# Patient Record
Sex: MALE | Race: WHITE | Employment: OTHER | URBAN - METROPOLITAN AREA
[De-identification: names, ages, dates, MRNs, and addresses within clinical notes are randomized per-mention and may not be internally consistent; named-entity substitution may affect disease eponyms.]

---

## 2021-05-24 ENCOUNTER — APPOINTMENT (OUTPATIENT)
Dept: GENERAL RADIOLOGY | Age: 36
End: 2021-05-24
Attending: EMERGENCY MEDICINE

## 2021-05-24 ENCOUNTER — HOSPITAL ENCOUNTER (EMERGENCY)
Age: 36
Discharge: HOME | End: 2021-05-24
Attending: EMERGENCY MEDICINE

## 2021-05-24 VITALS
RESPIRATION RATE: 14 BRPM | OXYGEN SATURATION: 95 % | WEIGHT: 260 LBS | DIASTOLIC BLOOD PRESSURE: 65 MMHG | TEMPERATURE: 99.8 F | HEIGHT: 73 IN | SYSTOLIC BLOOD PRESSURE: 107 MMHG | BODY MASS INDEX: 34.46 KG/M2 | HEART RATE: 80 BPM

## 2021-05-24 DIAGNOSIS — J20.9 ACUTE BRONCHITIS, UNSPECIFIED ORGANISM: Primary | ICD-10-CM

## 2021-05-24 DIAGNOSIS — Z20.822 PERSON UNDER INVESTIGATION FOR COVID-19: ICD-10-CM

## 2021-05-24 LAB
BASOPHILS # BLD: 0 K/UL (ref 0–0.1)
BASOPHILS NFR BLD: 0 % (ref 0–2)
DIFFERENTIAL METHOD BLD: ABNORMAL
EOSINOPHIL # BLD: 0.1 K/UL (ref 0–0.4)
EOSINOPHIL NFR BLD: 0 % (ref 0–5)
ERYTHROCYTE [DISTWIDTH] IN BLOOD BY AUTOMATED COUNT: 12.9 % (ref 11.6–14.5)
HCT VFR BLD AUTO: 44.8 % (ref 36–48)
HGB BLD-MCNC: 14.6 G/DL (ref 13–16)
LYMPHOCYTES # BLD: 1.3 K/UL (ref 0.9–3.6)
LYMPHOCYTES NFR BLD: 8 % (ref 21–52)
MCH RBC QN AUTO: 29 PG (ref 24–34)
MCHC RBC AUTO-ENTMCNC: 32.6 G/DL (ref 31–37)
MCV RBC AUTO: 88.9 FL (ref 74–97)
MONOCYTES # BLD: 1 K/UL (ref 0.05–1.2)
MONOCYTES NFR BLD: 7 % (ref 3–10)
NEUTS SEG # BLD: 12.7 K/UL (ref 1.8–8)
NEUTS SEG NFR BLD: 84 % (ref 40–73)
PLATELET # BLD AUTO: 253 K/UL (ref 135–420)
PMV BLD AUTO: 11.3 FL (ref 9.2–11.8)
RBC # BLD AUTO: 5.04 M/UL (ref 4.35–5.65)
SARS-COV-2, COV2: NORMAL
WBC # BLD AUTO: 15.1 K/UL (ref 4.6–13.2)

## 2021-05-24 PROCEDURE — 93005 ELECTROCARDIOGRAM TRACING: CPT

## 2021-05-24 PROCEDURE — U0003 INFECTIOUS AGENT DETECTION BY NUCLEIC ACID (DNA OR RNA); SEVERE ACUTE RESPIRATORY SYNDROME CORONAVIRUS 2 (SARS-COV-2) (CORONAVIRUS DISEASE [COVID-19]), AMPLIFIED PROBE TECHNIQUE, MAKING USE OF HIGH THROUGHPUT TECHNOLOGIES AS DESCRIBED BY CMS-2020-01-R: HCPCS

## 2021-05-24 PROCEDURE — 99284 EMERGENCY DEPT VISIT MOD MDM: CPT

## 2021-05-24 PROCEDURE — 85025 COMPLETE CBC W/AUTO DIFF WBC: CPT

## 2021-05-24 PROCEDURE — 71045 X-RAY EXAM CHEST 1 VIEW: CPT

## 2021-05-24 RX ORDER — ALBUTEROL SULFATE 90 UG/1
2 AEROSOL, METERED RESPIRATORY (INHALATION)
Qty: 1 INHALER | Refills: 0 | Status: SHIPPED | OUTPATIENT
Start: 2021-05-24

## 2021-05-24 RX ORDER — PREDNISONE 10 MG/1
TABLET ORAL
Qty: 21 TABLET | Refills: 0 | Status: SHIPPED | OUTPATIENT
Start: 2021-05-24

## 2021-05-24 RX ORDER — AZITHROMYCIN 250 MG/1
TABLET, FILM COATED ORAL
Qty: 6 TABLET | Refills: 0 | Status: SHIPPED | OUTPATIENT
Start: 2021-05-24 | End: 2021-05-29

## 2021-05-24 NOTE — ED PROVIDER NOTES
EMERGENCY DEPARTMENT HISTORY AND PHYSICAL EXAM    Date: 5/24/2021  Patient Name: Marie Rivero    History of Presenting Illness     Chief Complaint   Patient presents with    Shortness of Breath    Cough         History Provided By: Patient    11:30 AM  Marie Rivero is a 30 y.o. male with PMHX of anxiety, PTSD who presents to the emergency department C/O cough, wheezing, mild shortness of breath, nasal congestion and sore throat, which began 2 to 3 days ago. No known sick contacts or exposure to COVID-19. Patient smokes cigarettes. Wife had similar but much more mild symptoms. Pt denies chest pain, recent travel, vomiting, and any other sxs or complaints. PCP: None    Current Outpatient Medications   Medication Sig Dispense Refill    azithromycin (Zithromax Z-Stef) 250 mg tablet Use per pack instructions. 6 Tablet 0    predniSONE (STERAPRED DS) 10 mg dose pack Use per pack instructions. 21 Tablet 0    albuterol (PROVENTIL HFA, VENTOLIN HFA, PROAIR HFA) 90 mcg/actuation inhaler Take 2 Puffs by inhalation every four (4) hours as needed for Wheezing. 1 Inhaler 0    ALPRAZolam (XANAX) 2 mg tablet Take 1 Tab by mouth nightly as needed for Anxiety. 5 Tab 0    ALPRAZolam (XANAX) 2 mg tablet Take  by mouth two (2) times a day.  ALPRAZolam (XANAX) 2 mg tablet Take 1 Tab by mouth two (2) times daily as needed for Anxiety. 12 Tab 0    oxyCODONE-acetaminophen (PERCOCET) 5-325 mg per tablet Take 1-2 Tabs by mouth every four (4) hours as needed for Pain. 30 Tab 0    oxyCODONE-acetaminophen (PERCOCET) 5-325 mg per tablet Take 2 Tabs by mouth every four (4) hours as needed. Indications: PAIN      multivitamin (ONE A DAY) tablet Take 1 Tab by mouth daily.            Past History     Past Medical History:  Past Medical History:   Diagnosis Date    Anxiety     Pneumonia     PTSD (post-traumatic stress disorder)        Past Surgical History:  Past Surgical History:   Procedure Laterality Date    HX CYST REMOVAL         Family History:  History reviewed. No pertinent family history. Social History:  Social History     Tobacco Use    Smoking status: Current Every Day Smoker     Packs/day: 0.50     Years: 2.00     Pack years: 1.00    Smokeless tobacco: Never Used   Substance Use Topics    Alcohol use: Yes     Alcohol/week: 1.7 standard drinks     Types: 2 Cans of beer per week     Comment: couple of times a week    Drug use: No       Allergies:  No Known Allergies      Review of Systems   Review of Systems   Constitutional: Negative for fever. HENT: Positive for congestion and sore throat. Respiratory: Positive for cough, shortness of breath and wheezing. Cardiovascular: Negative for chest pain. All other systems reviewed and are negative. Physical Exam     Vitals:    05/24/21 1101 05/24/21 1200 05/24/21 1303 05/24/21 1305   BP: 103/67 (!) 110/55 107/65    Pulse: 92 82 80    Resp: 14 15 14    Temp: 99.8 °F (37.7 °C)      SpO2: 95% 92% 95% 95%   Weight: 117.9 kg (260 lb)      Height: 6' 1\" (1.854 m)        Physical Exam    Vital signs and nursing notes reviewed. CONSTITUTIONAL: Alert. Well-appearing; well-nourished; in no apparent distress. HEAD: Normocephalic; atraumatic. EYES:  Conjunctiva clear. ENT: TM's normal. External ear normal. Normal nose; no congestion, no rhinorrhea. Normal pharynx. Tonsils not enlarged without exudate. Moist mucus membranes. NECK: Supple; FROM without difficulty, non-tender; no cervical lymphadenopathy. CV: Normal S1, S2; no murmurs, rubs, or gallops. No chest wall tenderness. RESPIRATORY: Normal chest excursion with respiration; mild scattered expiratory wheezes bilaterally. SKIN: Normal for age and race; warm; dry; good turgor; no apparent lesions or exudate. NEURO: A & O x3. PSYCH:  Mood and affect appropriate.          Diagnostic Study Results     Labs -     Recent Results (from the past 12 hour(s))   EKG, 12 LEAD, INITIAL    Collection Time: 05/24/21 11:15 AM   Result Value Ref Range    Ventricular Rate 85 BPM    Atrial Rate 85 BPM    P-R Interval 128 ms    QRS Duration 96 ms    Q-T Interval 368 ms    QTC Calculation (Bezet) 437 ms    Calculated P Axis 18 degrees    Calculated R Axis 37 degrees    Calculated T Axis 38 degrees    Diagnosis       Normal sinus rhythm  Incomplete right bundle branch block  Borderline ECG  When compared with ECG of 19-NOV-2012 16:49,  No significant change was found     CBC WITH AUTOMATED DIFF    Collection Time: 05/24/21 11:25 AM   Result Value Ref Range    WBC 15.1 (H) 4.6 - 13.2 K/uL    RBC 5.04 4.35 - 5.65 M/uL    HGB 14.6 13.0 - 16.0 g/dL    HCT 44.8 36.0 - 48.0 %    MCV 88.9 74.0 - 97.0 FL    MCH 29.0 24.0 - 34.0 PG    MCHC 32.6 31.0 - 37.0 g/dL    RDW 12.9 11.6 - 14.5 %    PLATELET 911 541 - 704 K/uL    MPV 11.3 9.2 - 11.8 FL    NEUTROPHILS 84 (H) 40 - 73 %    LYMPHOCYTES 8 (L) 21 - 52 %    MONOCYTES 7 3 - 10 %    EOSINOPHILS 0 0 - 5 %    BASOPHILS 0 0 - 2 %    ABS. NEUTROPHILS 12.7 (H) 1.8 - 8.0 K/UL    ABS. LYMPHOCYTES 1.3 0.9 - 3.6 K/UL    ABS. MONOCYTES 1.0 0.05 - 1.2 K/UL    ABS. EOSINOPHILS 0.1 0.0 - 0.4 K/UL    ABS. BASOPHILS 0.0 0.0 - 0.1 K/UL    DF AUTOMATED     SARS-COV-2    Collection Time: 05/24/21  1:00 PM   Result Value Ref Range    SARS-CoV-2 Please find results under separate order         Radiologic Studies -   XR CHEST PORT   Final Result      No acute radiographic cardiopulmonary abnormality. CT Results  (Last 48 hours)    None        CXR Results  (Last 48 hours)               05/24/21 1154  XR CHEST PORT Final result    Impression:      No acute radiographic cardiopulmonary abnormality. Narrative:  EXAM: XR CHEST PORT       CLINICAL INDICATION/HISTORY: shortness of breath   -Additional: None       COMPARISON: None       TECHNIQUE: Frontal view of the chest       _______________       FINDINGS:       HEART AND MEDIASTINUM: Normal cardiac size and mediastinal contours.        LUNGS AND PLEURAL SPACES: No focal pneumonic consolidation, pneumothorax, or   pleural effusion. BONY THORAX AND SOFT TISSUES: No acute osseous abnormality       _______________                 Medications given in the ED-  Medications - No data to display      Medical Decision Making   I am the first provider for this patient. I reviewed the vital signs, available nursing notes, past medical history, past surgical history, family history and social history. Vital Signs-Reviewed the patient's vital signs. Pulse Oximetry Analysis - 95% on RA       EKG interpretation: (Preliminary)   NSR, rate 85, No STEMI    Records Reviewed: Nursing Notes      Procedures:  Procedures    ED Course:  11:30 AM   Initial assessment performed. The patients presenting problems have been discussed, and they are in agreement with the care plan formulated and outlined with them. I have encouraged them to ask questions as they arise throughout their visit. Provider Notes (Medical Decision Making): James Steiner is a 30 y.o. male presented with URI symptoms, wheezing on exam.  Vitals stable, O2 saturation greater than 95% on room air and he is in no distress. Chest x-ray shows no acute bony or abnormality. EKG without ischemic changes. White blood cell count slightly elevated, exam consistent with bronchitis, COVID-19 test sent and pending at this time. Smoking cessation discussed as well as prescriptions for Zithromax, albuterol inhaler, prednisone taper, strict isolation per current CDC guidelines and return precautions. Diagnosis and Disposition       DISCHARGE NOTE:    Micaela Batista's  results have been reviewed with him. He has been counseled regarding his diagnosis, treatment, and plan. He verbally conveys understanding and agreement of the signs, symptoms, diagnosis, treatment and prognosis and additionally agrees to follow up as discussed.   He also agrees with the care-plan and conveys that all of his questions have been answered. I have also provided discharge instructions for him that include: educational information regarding their diagnosis and treatment, and list of reasons why they would want to return to the ED prior to their follow-up appointment, should his condition change. He has been provided with education for proper emergency department utilization. CLINICAL IMPRESSION:    1. Acute bronchitis, unspecified organism    2. Person under investigation for COVID-19        PLAN:  1. D/C Home  2. Discharge Medication List as of 5/24/2021 12:50 PM      START taking these medications    Details   azithromycin (Zithromax Z-Stef) 250 mg tablet Use per pack instructions. , Normal, Disp-6 Tablet, R-0      predniSONE (STERAPRED DS) 10 mg dose pack Use per pack instructions. , Normal, Disp-21 Tablet, R-0      albuterol (PROVENTIL HFA, VENTOLIN HFA, PROAIR HFA) 90 mcg/actuation inhaler Take 2 Puffs by inhalation every four (4) hours as needed for Wheezing., Normal, Disp-1 Inhaler, R-0         CONTINUE these medications which have NOT CHANGED    Details   !! ALPRAZolam (XANAX) 2 mg tablet Take 1 Tab by mouth nightly as needed for Anxiety. , Print, Disp-5 Tab, R-0      !! ALPRAZolam (XANAX) 2 mg tablet Take  by mouth two (2) times a day. Historical Med      !! ALPRAZolam (XANAX) 2 mg tablet Take 1 Tab by mouth two (2) times daily as needed for Anxiety. Print, 2 mg, Disp-12 Tab, R-0      !! oxyCODONE-acetaminophen (PERCOCET) 5-325 mg per tablet Take 1-2 Tabs by mouth every four (4) hours as needed for Pain. Print, 1-2 Tab, Disp-30 Tab, R-0      !! oxyCODONE-acetaminophen (PERCOCET) 5-325 mg per tablet Take 2 Tabs by mouth every four (4) hours as needed. Indications: PAINHistorical Med, 2 Tab      multivitamin (ONE A DAY) tablet Take 1 Tab by mouth daily. Historical Med, 1 Tab       !! - Potential duplicate medications found. Please discuss with provider.         3.   Follow-up Information     Follow up With Specialties Details Why Contact Info    Your PCP or Urgent Care  Schedule an appointment as soon as possible for a visit       [facility] EMERGENCY DE[facility] Emergency Medicine  As needed, If symptoms worsen [address] [address]  [phone]        _______________________________      Please note that this dictation was completed with Dogster, the computer voice recognition software. Quite often unanticipated grammatical, syntax, homophones, and other interpretive errors are inadvertently transcribed by the computer software. Please disregard these errors. Please excuse any errors that have escaped final proofreading.

## 2021-05-25 ENCOUNTER — PATIENT OUTREACH (OUTPATIENT)
Dept: CASE MANAGEMENT | Age: 36
End: 2021-05-25

## 2021-05-25 LAB
ATRIAL RATE: 85 BPM
CALCULATED P AXIS, ECG09: 18 DEGREES
CALCULATED R AXIS, ECG10: 37 DEGREES
CALCULATED T AXIS, ECG11: 38 DEGREES
DIAGNOSIS, 93000: NORMAL
P-R INTERVAL, ECG05: 128 MS
Q-T INTERVAL, ECG07: 368 MS
QRS DURATION, ECG06: 96 MS
QTC CALCULATION (BEZET), ECG08: 437 MS
SARS-COV-2, COV2NT: NOT DETECTED
VENTRICULAR RATE, ECG03: 85 BPM

## 2021-05-25 NOTE — PROGRESS NOTES
Patient contacted regarding [id number]. Discussed COVID-19 related testing which was available at this time. Test results were negative. Patient informed of results, if available? n/a. Outreach made within 2 business days of discharge: Reza  Coordinator contacted the patient by telephone to perform post discharge assessment. Verified name and  with patient as identifiers. Provided introduction to self, and explanation of LPN Care Coordinator role, and reason for call due to risk factors for infection and/or exposure to COVID-19. Symptoms reviewed with patient who verbalized the following symptoms: no new symptoms, no worsening symptoms and Patient states he feels perfectly fine. No symptoms at all during this time. Denies SOB, chest pain, cough, chills, fever, body aches, wheezing, lightheadness/dizziness,HA, n/v/d at this time. Due to no new or worsening symptoms encounter was not routed to provider for escalation. Discussed follow-up appointments. If no appointment was previously scheduled, appointment scheduling offered: yes  Counts include 234 beds at the [facility] Yahaira Martell follow up appointment(s): No future appointments. Non-[facility] follow up appointment(s): n/a    Patient encouraged to make an appointment with PCP for f/up. Advised to return to ED if symptoms worsen or fail to improve. Patients acknowledges understanding. Advance Care Planning:   Does patient have an Advance Directive: currently not on file; education provided     Patient has following risk factors of: Smoker    LPN reviewed discharge instructions, medical action plan and red flags such as increased shortness of breath, increasing fever and signs of decompensation with patient who verbalized understanding. Discussed exposure protocols and quarantine with CDC Guidelines What to do if you are sick with coronavirus disease .  Patient was given an opportunity for questions and concerns.  The patient agrees to contact the [facility] exposure line [phone], [facility] [address]EN[address]  ([phone]} and PCP office for questions related to their healthcare. LPN provided contact information for future needs. Reviewed and educated patient on any new and changed medications related to discharge diagnosis. Patient states he has received 3 calls from [facility] within  the last two hours. He is feeling fine. No symptoms at al during this time and does not wish to receive additional calls from [address]

## 2023-09-19 PROBLEM — G56.01 CARPAL TUNNEL SYNDROME OF RIGHT WRIST: Chronic | Status: ACTIVE | Noted: 2023-09-19

## 2023-09-19 NOTE — H&P
History and Physical        Patient: Arleth Laguerre               Sex: male          DOA: (Not on file)         YOB: 1985      Age:  41 y.o.        LOS:  LOS: 0 days        HPI:     Lex Dean is in for follow up of his off right-greater-than-left severe EMG-documented carpal tunnel syndrome. The patient is here today for evaluation and treatment. He is also complaining of some posterior cervical and shoulder pain, which seems to be related to his hands. He got the EMGs done, but he has not been back in to be evaluated. His hands do wake him up nightly. They are completely numb in the median nerve distribution, and he mentions the right hand is worse than the left. It takes him a good three to four hours in the morning to get the right hand working well enough for him to continue doing his job. Past Medical History:   Diagnosis Date    ADHD     On Call Psychiatry    Anxiety     On Call Psychiatry    Carpal tunnel syndrome     right, Helon Revel    Knee pain, left 2014    injured at work, previously followed by rheumatology per 1/30/15 ED note    Panic attacks     On Call Psychiatry    Pneumonia     PTSD (post-traumatic stress disorder)     On Call Psychiatry       Past Surgical History:   Procedure Laterality Date    ABSCESS DRAINAGE  01/24/2010    between intragluteal folds    PILONIDAL CYST EXCISION  11/21/2012    WISDOM TOOTH EXTRACTION         No family history on file. Social History     Socioeconomic History    Marital status: Single   Tobacco Use    Smoking status: Every Day     Packs/day: .5     Types: Cigarettes    Smokeless tobacco: Never   Vaping Use    Vaping Use: Never used   Substance and Sexual Activity    Alcohol use: Not Currently     Alcohol/week: 1.7 standard drinks of alcohol    Drug use: Yes     Types: Marijuana Kym Larve)       Prior to Admission medications    Medication Sig Start Date End Date Taking?  Authorizing Provider   amphetamine-dextroamphetamine (ADDERALL) 20 MG

## 2023-09-22 ENCOUNTER — HOSPITAL ENCOUNTER (OUTPATIENT)
Facility: HOSPITAL | Age: 38
Discharge: HOME | End: 2023-09-25

## 2023-09-22 VITALS — HEIGHT: 71 IN | WEIGHT: 233 LBS | BODY MASS INDEX: 32.62 KG/M2

## 2023-09-22 LAB — SENTARA SPECIMEN COLLECTION: NORMAL

## 2023-09-22 NOTE — PERIOP NOTE
Neck 16.5\", denies diabetes & sleep apnea.  Not taking blood thinners, digoxin, nor lithium; no implants

## 2023-09-26 NOTE — PERIOP NOTE
HgA1C order faxed to [facility] Reference lab with request to add on to the blood work collected on 9-22-23. Fax confirmation received.

## 2023-09-28 ENCOUNTER — ANESTHESIA (OUTPATIENT)
Facility: HOSPITAL | Age: 38
End: 2023-09-28
Payer: MEDICAID

## 2023-09-28 ENCOUNTER — HOSPITAL ENCOUNTER (OUTPATIENT)
Facility: HOSPITAL | Age: 38
Setting detail: OUTPATIENT SURGERY
Discharge: HOME | End: 2023-09-28
Attending: ORTHOPAEDIC SURGERY | Admitting: ORTHOPAEDIC SURGERY
Payer: MEDICAID

## 2023-09-28 ENCOUNTER — ANESTHESIA EVENT (OUTPATIENT)
Facility: HOSPITAL | Age: 38
End: 2023-09-28
Payer: MEDICAID

## 2023-09-28 VITALS
TEMPERATURE: 98.2 F | SYSTOLIC BLOOD PRESSURE: 128 MMHG | BODY MASS INDEX: 31.61 KG/M2 | WEIGHT: 233.4 LBS | DIASTOLIC BLOOD PRESSURE: 69 MMHG | HEART RATE: 79 BPM | OXYGEN SATURATION: 100 % | RESPIRATION RATE: 15 BRPM | HEIGHT: 72 IN

## 2023-09-28 DIAGNOSIS — Z01.818 PRE-OP TESTING: ICD-10-CM

## 2023-09-28 DIAGNOSIS — G56.01 CARPAL TUNNEL SYNDROME OF RIGHT WRIST: Primary | Chronic | ICD-10-CM

## 2023-09-28 PROCEDURE — 6360000002 HC RX W HCPCS: Performed by: ORTHOPAEDIC SURGERY

## 2023-09-28 PROCEDURE — 2500000003 HC RX 250 WO HCPCS: Performed by: NURSE ANESTHETIST, CERTIFIED REGISTERED

## 2023-09-28 PROCEDURE — 7100000010 HC PHASE II RECOVERY - FIRST 15 MIN: Performed by: ORTHOPAEDIC SURGERY

## 2023-09-28 PROCEDURE — 7100000011 HC PHASE II RECOVERY - ADDTL 15 MIN: Performed by: ORTHOPAEDIC SURGERY

## 2023-09-28 PROCEDURE — 6360000002 HC RX W HCPCS: Performed by: NURSE ANESTHETIST, CERTIFIED REGISTERED

## 2023-09-28 PROCEDURE — 3700000001 HC ADD 15 MINUTES (ANESTHESIA): Performed by: ORTHOPAEDIC SURGERY

## 2023-09-28 PROCEDURE — 3600000002 HC SURGERY LEVEL 2 BASE: Performed by: ORTHOPAEDIC SURGERY

## 2023-09-28 PROCEDURE — C1713 ANCHOR/SCREW BN/BN,TIS/BN: HCPCS | Performed by: ORTHOPAEDIC SURGERY

## 2023-09-28 PROCEDURE — 7100000000 HC PACU RECOVERY - FIRST 15 MIN: Performed by: ORTHOPAEDIC SURGERY

## 2023-09-28 PROCEDURE — 3600000012 HC SURGERY LEVEL 2 ADDTL 15MIN: Performed by: ORTHOPAEDIC SURGERY

## 2023-09-28 PROCEDURE — 3700000000 HC ANESTHESIA ATTENDED CARE: Performed by: ORTHOPAEDIC SURGERY

## 2023-09-28 PROCEDURE — 2580000003 HC RX 258: Performed by: ORTHOPAEDIC SURGERY

## 2023-09-28 PROCEDURE — 2709999900 HC NON-CHARGEABLE SUPPLY: Performed by: ORTHOPAEDIC SURGERY

## 2023-09-28 PROCEDURE — 7100000001 HC PACU RECOVERY - ADDTL 15 MIN: Performed by: ORTHOPAEDIC SURGERY

## 2023-09-28 RX ORDER — FENTANYL CITRATE 50 UG/ML
50 INJECTION, SOLUTION INTRAMUSCULAR; INTRAVENOUS EVERY 5 MIN PRN
Status: DISCONTINUED | OUTPATIENT
Start: 2023-09-28 | End: 2023-09-28 | Stop reason: HOSPADM

## 2023-09-28 RX ORDER — PROPOFOL 10 MG/ML
INJECTION, EMULSION INTRAVENOUS PRN
Status: DISCONTINUED | OUTPATIENT
Start: 2023-09-28 | End: 2023-09-28 | Stop reason: SDUPTHER

## 2023-09-28 RX ORDER — SODIUM CHLORIDE 0.9 % (FLUSH) 0.9 %
5-40 SYRINGE (ML) INJECTION EVERY 12 HOURS SCHEDULED
Status: DISCONTINUED | OUTPATIENT
Start: 2023-09-28 | End: 2023-09-28 | Stop reason: HOSPADM

## 2023-09-28 RX ORDER — SODIUM CHLORIDE, SODIUM LACTATE, POTASSIUM CHLORIDE, CALCIUM CHLORIDE 600; 310; 30; 20 MG/100ML; MG/100ML; MG/100ML; MG/100ML
INJECTION, SOLUTION INTRAVENOUS CONTINUOUS
Status: DISCONTINUED | OUTPATIENT
Start: 2023-09-28 | End: 2023-09-28 | Stop reason: HOSPADM

## 2023-09-28 RX ORDER — ONDANSETRON 2 MG/ML
4 INJECTION INTRAMUSCULAR; INTRAVENOUS
Status: DISCONTINUED | OUTPATIENT
Start: 2023-09-28 | End: 2023-09-28 | Stop reason: HOSPADM

## 2023-09-28 RX ORDER — ONDANSETRON 2 MG/ML
INJECTION INTRAMUSCULAR; INTRAVENOUS PRN
Status: DISCONTINUED | OUTPATIENT
Start: 2023-09-28 | End: 2023-09-28 | Stop reason: SDUPTHER

## 2023-09-28 RX ORDER — OXYCODONE HYDROCHLORIDE 5 MG/1
10 TABLET ORAL PRN
Status: DISCONTINUED | OUTPATIENT
Start: 2023-09-28 | End: 2023-09-28 | Stop reason: HOSPADM

## 2023-09-28 RX ORDER — SODIUM CHLORIDE 9 MG/ML
INJECTION, SOLUTION INTRAVENOUS PRN
Status: DISCONTINUED | OUTPATIENT
Start: 2023-09-28 | End: 2023-09-28 | Stop reason: HOSPADM

## 2023-09-28 RX ORDER — MIDAZOLAM HYDROCHLORIDE 1 MG/ML
INJECTION INTRAMUSCULAR; INTRAVENOUS PRN
Status: DISCONTINUED | OUTPATIENT
Start: 2023-09-28 | End: 2023-09-28 | Stop reason: SDUPTHER

## 2023-09-28 RX ORDER — DEXAMETHASONE SODIUM PHOSPHATE 4 MG/ML
INJECTION, SOLUTION INTRA-ARTICULAR; INTRALESIONAL; INTRAMUSCULAR; INTRAVENOUS; SOFT TISSUE PRN
Status: DISCONTINUED | OUTPATIENT
Start: 2023-09-28 | End: 2023-09-28 | Stop reason: SDUPTHER

## 2023-09-28 RX ORDER — SODIUM CHLORIDE 0.9 % (FLUSH) 0.9 %
5-40 SYRINGE (ML) INJECTION PRN
Status: DISCONTINUED | OUTPATIENT
Start: 2023-09-28 | End: 2023-09-28 | Stop reason: HOSPADM

## 2023-09-28 RX ORDER — FENTANYL CITRATE 50 UG/ML
INJECTION, SOLUTION INTRAMUSCULAR; INTRAVENOUS PRN
Status: DISCONTINUED | OUTPATIENT
Start: 2023-09-28 | End: 2023-09-28 | Stop reason: SDUPTHER

## 2023-09-28 RX ORDER — KETOROLAC TROMETHAMINE 15 MG/ML
INJECTION, SOLUTION INTRAMUSCULAR; INTRAVENOUS PRN
Status: DISCONTINUED | OUTPATIENT
Start: 2023-09-28 | End: 2023-09-28 | Stop reason: SDUPTHER

## 2023-09-28 RX ORDER — OXYCODONE HYDROCHLORIDE 5 MG/1
5 TABLET ORAL PRN
Status: DISCONTINUED | OUTPATIENT
Start: 2023-09-28 | End: 2023-09-28 | Stop reason: HOSPADM

## 2023-09-28 RX ORDER — BUPIVACAINE HYDROCHLORIDE 2.5 MG/ML
INJECTION, SOLUTION EPIDURAL; INFILTRATION; INTRACAUDAL PRN
Status: DISCONTINUED | OUTPATIENT
Start: 2023-09-28 | End: 2023-09-28 | Stop reason: ALTCHOICE

## 2023-09-28 RX ORDER — LIDOCAINE HYDROCHLORIDE 20 MG/ML
INJECTION, SOLUTION EPIDURAL; INFILTRATION; INTRACAUDAL; PERINEURAL PRN
Status: DISCONTINUED | OUTPATIENT
Start: 2023-09-28 | End: 2023-09-28 | Stop reason: SDUPTHER

## 2023-09-28 RX ORDER — IBUPROFEN 400 MG/1
TABLET ORAL
COMMUNITY
Start: 2023-07-25

## 2023-09-28 RX ADMIN — MIDAZOLAM 2 MG: 1 INJECTION INTRAMUSCULAR; INTRAVENOUS at 12:28

## 2023-09-28 RX ADMIN — PROPOFOL 200 MG: 10 INJECTION, EMULSION INTRAVENOUS at 12:31

## 2023-09-28 RX ADMIN — KETOROLAC TROMETHAMINE 30 MG: 15 INJECTION, SOLUTION INTRAMUSCULAR; INTRAVENOUS at 12:55

## 2023-09-28 RX ADMIN — SODIUM CHLORIDE, POTASSIUM CHLORIDE, SODIUM LACTATE AND CALCIUM CHLORIDE: 600; 310; 30; 20 INJECTION, SOLUTION INTRAVENOUS at 10:55

## 2023-09-28 RX ADMIN — ONDANSETRON HYDROCHLORIDE 4 MG: 2 INJECTION INTRAMUSCULAR; INTRAVENOUS at 12:31

## 2023-09-28 RX ADMIN — DEXAMETHASONE SODIUM PHOSPHATE 4 MG: 4 INJECTION, SOLUTION INTRAMUSCULAR; INTRAVENOUS at 12:31

## 2023-09-28 RX ADMIN — FENTANYL CITRATE 100 MCG: 50 INJECTION, SOLUTION INTRAMUSCULAR; INTRAVENOUS at 12:28

## 2023-09-28 RX ADMIN — LIDOCAINE HYDROCHLORIDE 100 MG: 20 INJECTION, SOLUTION EPIDURAL; INFILTRATION; INTRACAUDAL; PERINEURAL at 12:31

## 2023-09-28 RX ADMIN — FENTANYL CITRATE 100 MCG: 50 INJECTION, SOLUTION INTRAMUSCULAR; INTRAVENOUS at 12:53

## 2023-09-28 ASSESSMENT — PAIN - FUNCTIONAL ASSESSMENT: PAIN_FUNCTIONAL_ASSESSMENT: 0-10

## 2023-09-28 ASSESSMENT — LIFESTYLE VARIABLES: SMOKING_STATUS: 1

## 2023-09-28 ASSESSMENT — PAIN DESCRIPTION - DESCRIPTORS: DESCRIPTORS: NUMBNESS;TINGLING;SHOOTING

## 2023-09-28 NOTE — PERIOP NOTE
TRANSFER - IN REPORT:    Verbal report received from OR nurse and CRNA on Elisabeth Marte  being received from OR for routine post-op      Report consisted of patient's Situation, Background, Assessment and   Recommendations(SBAR). Information from the following report(s) Nurse Handoff Report, Adult Overview, Surgery Report, Intake/Output, MAR, Recent Results, and Med Rec Status was reviewed with the receiving nurse. Opportunity for questions and clarification was provided. Assessment completed upon patient's arrival to unit and care assumed.

## 2023-09-28 NOTE — PERIOP NOTE
TRANSFER - OUT REPORT:    Verbal report given to Milli Kelly RN on Cornelius Lora  being transferred to phase 2 for routine post-op       Report consisted of patient's Situation, Background, Assessment and   Recommendations(SBAR). Information from the following report(s) Surgery Report was reviewed with the receiving nurse. Lines:   Peripheral IV 09/28/23 Left;Ventral Forearm (Active)   Site Assessment Clean, dry & intact 09/28/23 1305   Line Status Infusing 09/28/23 1305   Phlebitis Assessment No symptoms 09/28/23 1305   Infiltration Assessment 0 09/28/23 1305   Dressing Status Clean, dry & intact 09/28/23 1305   Dressing Type Transparent 09/28/23 1305        Opportunity for questions and clarification was provided.       Patient transported with:  [employer]

## 2023-09-28 NOTE — PERIOP NOTE
TRANSFER - IN REPORT:    Verbal report received from [address] on Erik Rodriguez  being received from PACU for routine progression of patient care      Report consisted of patient's Situation, Background, Assessment and   Recommendations(SBAR). Information from the following report(s) Nurse Handoff Report, Surgery Report, Intake/Output, MAR, and Recent Results was reviewed with the receiving nurse. Opportunity for questions and clarification was provided. Assessment completed upon patient's arrival to unit and care assumed.

## 2023-09-28 NOTE — OP NOTE
Single Incision Endoscopic Carpal Tunnel Release    Patient: Rocio Thibodeaux MRN: [id number]  CSN: [id number]   YOB: 1985  Age: 41 y.o. Sex: male      Date of Surgery:9/28/2023     PREOPERATIVE DIAGNOSES:  right Carpal Tunnel Syndrome      POSTOPERATIVE DIAGNOSES:   right Carpal Tunnel Syndrome      PROCEDURE: right Single Incision Endoscpoic Carpal Tunnel Release    IMPLANTS: * No implants in log *    SURGEON: IRENA Flores III, MD    FIRST ASSISTANT: Scrub Person First: Mike Hill RN  Scrub Person Second: Abbi Glover RN  Physician Assistant: El Burroughs PA-C    SPECIMEN TO PATHOLOGY:  * No specimens in log *    ESTIMATED BLOOD LOSS: none    FINDINGS:  Same     TOURNIQUET  TIME:   Approximately 5 minutes at 043 Hg     COMPLICATIONS:  None     ANESTHESIA:  General    INDICATIONS:  A 41 y.o. White (non-) male  with known carpal tunnel syndrome documented by clinical exam and nerve conduction studies. The patient now presents for a ECTR. PROCEDURE: The patient was brought into the operating theater and after adequate prepping and draping of the surgical wrist and hand the limb was then exsanguinated with an Esmarch bandage and this was clamped around the proximal forearm. A 1 cm transverse incision was placed at the volar flexion wrist crease just ulnar to the palmaris longus. The incision was deepened to the antebrachial fascia which was released the length of the wound and proximally for an additional 1 cm. Using the [employer] carpal tunnel release kit, the carpal tunnel was dilated in line with the fourth metacarpal to the distal extent of the transverse carpal ligament. The cannula was then inserted and kept on some proximal radial deviation with the wrist in slight extension, and it was passed all the way to the cardinal line of Lewis.   The scope was then inserted with the wrist in slight extension and the undersurface of the transverse carpal ligament was

## 2023-09-28 NOTE — PERIOP NOTE
Reviewed PTA medication list with patient/caregiver and patient/caregiver denies any additional medications. Patient admits to having a responsible adult care for them at home for at least 24 hours after surgery. Patient encouraged to use gown warming system and informed that using said warming gown to regulate body temperature prior to a procedure has been shown to help reduce the risks of blood clots and infection. Patient's pharmacy of choice verified and documented in PTA medication section.     Dual skin assessment & fall risk band verification completed with Maria Elena REINOSO RN.

## 2023-09-28 NOTE — INTERVAL H&P NOTE
Update History & Physical    The patient's History and Physical of September 19, the surgery was reviewed with the patient and I examined the patient. There was no change. The surgical site was confirmed by the patient and me. Plan: The risks, benefits, expected outcome, and alternative to the recommended procedure have been discussed with the patient. Patient understands and wants to proceed with the procedure.      Electronically signed by Robin Painting MD on 9/28/2023 at 12:16 PM

## 2025-08-15 ENCOUNTER — HOSPITAL ENCOUNTER (OUTPATIENT)
Facility: HOSPITAL | Age: 40
Setting detail: SPECIMEN
Discharge: HOME | End: 2025-08-18

## 2025-08-15 ENCOUNTER — HOSPITAL ENCOUNTER (OUTPATIENT)
Facility: HOSPITAL | Age: 40
Discharge: HOME | End: 2025-08-18
Payer: MEDICAID

## 2025-08-15 DIAGNOSIS — M48.02 SPINAL STENOSIS IN CERVICAL REGION: ICD-10-CM

## 2025-08-15 LAB — SENTARA SPECIMEN COLLECTION: NORMAL

## 2025-08-15 PROCEDURE — 99001 SPECIMEN HANDLING PT-LAB: CPT

## 2025-08-15 PROCEDURE — 93005 ELECTROCARDIOGRAM TRACING: CPT

## 2025-08-18 LAB
EKG ATRIAL RATE: 69 BPM
EKG DIAGNOSIS: NORMAL
EKG P AXIS: 40 DEGREES
EKG P-R INTERVAL: 122 MS
EKG Q-T INTERVAL: 376 MS
EKG QRS DURATION: 92 MS
EKG QTC CALCULATION (BAZETT): 402 MS
EKG R AXIS: 44 DEGREES
EKG T AXIS: 52 DEGREES
EKG VENTRICULAR RATE: 69 BPM

## 2025-08-19 ENCOUNTER — HOSPITAL ENCOUNTER (OUTPATIENT)
Facility: HOSPITAL | Age: 40
Setting detail: SPECIMEN
Discharge: HOME | End: 2025-08-22

## 2025-08-19 PROBLEM — M50.20 HERNIATED NUCLEUS PULPOSUS, CERVICAL: Status: ACTIVE | Noted: 2025-08-19

## 2025-08-19 PROBLEM — M50.30 DDD (DEGENERATIVE DISC DISEASE), CERVICAL: Status: ACTIVE | Noted: 2025-08-19

## 2025-08-19 LAB — SENTARA SPECIMEN COLLECTION: NORMAL

## 2025-08-19 PROCEDURE — 99001 SPECIMEN HANDLING PT-LAB: CPT

## (undated) DEVICE — MASTISOL ADHESIVE LIQ 2/3ML

## (undated) DEVICE — BANDAGE,ELASTIC,ESMARK,STERILE,4"X9',LF: Brand: MEDLINE

## (undated) DEVICE — BNDG,ELSTC,MATRIX,STRL,3"X5YD,LF,HOOK&LP: Brand: MEDLINE

## (undated) DEVICE — PACK PROCEDURE SURG EXTREMITY CUST

## (undated) DEVICE — GOWN,SIRUS,NONRNF,SETINSLV,XL,20/CS: Brand: MEDLINE

## (undated) DEVICE — GLOVE SURG SZ 85 L12IN THK75MIL DK GRN LTX FREE

## (undated) DEVICE — STRIP,CLOSURE,WOUND,MEDI-STRIP,1/2X4: Brand: MEDLINE

## (undated) DEVICE — GARMENT,MEDLINE,DVT,INT,CALF,MED, GEN2: Brand: MEDLINE

## (undated) DEVICE — APPLICATOR MEDICATED 26 CC SOLUTION HI LT ORNG CHLORAPREP

## (undated) DEVICE — CTS RELIEF KIT, CARPAL TUNNEL SYNDROME RELIEF KIT: Brand: CTS RELIEF KIT

## (undated) DEVICE — GLOVE SURG SZ 8 L12IN THK75MIL DK GRN LTX FREE

## (undated) DEVICE — NEEDLE 25GA X 1.5 IN ECLIPSE